# Patient Record
Sex: MALE | Race: WHITE | NOT HISPANIC OR LATINO
[De-identification: names, ages, dates, MRNs, and addresses within clinical notes are randomized per-mention and may not be internally consistent; named-entity substitution may affect disease eponyms.]

---

## 2021-06-18 ENCOUNTER — TRANSCRIPTION ENCOUNTER (OUTPATIENT)
Age: 31
End: 2021-06-18

## 2022-03-05 ENCOUNTER — TRANSCRIPTION ENCOUNTER (OUTPATIENT)
Age: 32
End: 2022-03-05

## 2022-03-07 PROBLEM — Z00.00 ENCOUNTER FOR PREVENTIVE HEALTH EXAMINATION: Status: ACTIVE | Noted: 2022-03-07

## 2022-03-09 ENCOUNTER — APPOINTMENT (OUTPATIENT)
Dept: DERMATOLOGY | Facility: CLINIC | Age: 32
End: 2022-03-09
Payer: COMMERCIAL

## 2022-03-09 DIAGNOSIS — S61.309A UNSPECIFIED OPEN WOUND OF UNSPECIFIED FINGER WITH DAMAGE TO NAIL, INITIAL ENCOUNTER: ICD-10-CM

## 2022-03-09 DIAGNOSIS — Z91.89 OTHER SPECIFIED PERSONAL RISK FACTORS, NOT ELSEWHERE CLASSIFIED: ICD-10-CM

## 2022-03-09 PROCEDURE — 99202 OFFICE O/P NEW SF 15 MIN: CPT

## 2022-03-09 NOTE — PHYSICAL EXAM
[Alert] : alert [Oriented x 3] : ~L oriented x 3 [Well Nourished] : well nourished [FreeTextEntry3] : Type II skin\par \par Patient wearing a facemask \par \par Right thumbnail: Lateral portion is missing with onycholysis extending to the proximal nail fold involving the lateral half of the remaining nail plate\par The medial portion is within normal limits and extends to the base\par Marked firm thickening of the lateral proximal lunula\par Small resolving crust present on the exposed to nail bed\par Slight erythema and swelling present on the lateral portion of the proximal paronychial area\par \par No evidence of infection\par No friable lesions present\par \par Photo on patient's phone shows a small friable papule at the site of the currently resolving crust on the nailbed

## 2022-03-09 NOTE — HISTORY OF PRESENT ILLNESS
[FreeTextEntry1] : Dystrophy of right thumb [de-identified] : First visit for 32-year-old white male with a 2-1/2 week history of having "ripped off" part of his right thumbnail during work as a .  Had bleeding from both the proximal and distal portions of the nail plate areas.  Patient subsequently removed part of the nail plate.\par Patient subsequently developed a "growth and bad smell" at the site of the avulsion.  \par Seen at a walk-in clinic on on March 4. Diagnosed as having a "possible infection".\par Treated with an antibiotic (? what) with improvement.

## 2022-03-09 NOTE — ASSESSMENT
[FreeTextEntry1] : Partial avulsion of right thumbnail\par No evidence of infection\par No evidence of active pyogenic granuloma